# Patient Record
Sex: FEMALE | Race: WHITE | NOT HISPANIC OR LATINO | URBAN - METROPOLITAN AREA
[De-identification: names, ages, dates, MRNs, and addresses within clinical notes are randomized per-mention and may not be internally consistent; named-entity substitution may affect disease eponyms.]

---

## 2024-02-10 ENCOUNTER — EMERGENCY (EMERGENCY)
Facility: HOSPITAL | Age: 24
LOS: 1 days | Discharge: HOME | End: 2024-02-10
Attending: EMERGENCY MEDICINE | Admitting: EMERGENCY MEDICINE
Payer: COMMERCIAL

## 2024-02-10 VITALS
SYSTOLIC BLOOD PRESSURE: 110 MMHG | HEART RATE: 83 BPM | WEIGHT: 149.91 LBS | DIASTOLIC BLOOD PRESSURE: 73 MMHG | TEMPERATURE: 98 F | RESPIRATION RATE: 18 BRPM | OXYGEN SATURATION: 98 %

## 2024-02-10 VITALS
HEART RATE: 70 BPM | RESPIRATION RATE: 18 BRPM | SYSTOLIC BLOOD PRESSURE: 114 MMHG | DIASTOLIC BLOOD PRESSURE: 65 MMHG | OXYGEN SATURATION: 98 %

## 2024-02-10 LAB
ANION GAP SERPL CALC-SCNC: 10 MMOL/L — SIGNIFICANT CHANGE UP (ref 5–17)
APPEARANCE UR: CLEAR — SIGNIFICANT CHANGE UP
BACTERIA # UR AUTO: ABNORMAL /HPF
BASOPHILS # BLD AUTO: 0.04 K/UL — SIGNIFICANT CHANGE UP (ref 0–0.2)
BASOPHILS NFR BLD AUTO: 0.4 % — SIGNIFICANT CHANGE UP (ref 0–2)
BILIRUB UR-MCNC: NEGATIVE — SIGNIFICANT CHANGE UP
BUN SERPL-MCNC: 15 MG/DL — SIGNIFICANT CHANGE UP (ref 7–23)
CALCIUM SERPL-MCNC: 9.4 MG/DL — SIGNIFICANT CHANGE UP (ref 8.4–10.5)
CAST: 4 /LPF — SIGNIFICANT CHANGE UP (ref 0–4)
CHLORIDE SERPL-SCNC: 100 MMOL/L — SIGNIFICANT CHANGE UP (ref 96–108)
CO2 SERPL-SCNC: 26 MMOL/L — SIGNIFICANT CHANGE UP (ref 22–31)
COLOR SPEC: YELLOW — SIGNIFICANT CHANGE UP
CREAT SERPL-MCNC: 0.66 MG/DL — SIGNIFICANT CHANGE UP (ref 0.5–1.3)
DIFF PNL FLD: NEGATIVE — SIGNIFICANT CHANGE UP
EGFR: 125 ML/MIN/1.73M2 — SIGNIFICANT CHANGE UP
EOSINOPHIL # BLD AUTO: 0.06 K/UL — SIGNIFICANT CHANGE UP (ref 0–0.5)
EOSINOPHIL NFR BLD AUTO: 0.6 % — SIGNIFICANT CHANGE UP (ref 0–6)
GLUCOSE SERPL-MCNC: 82 MG/DL — SIGNIFICANT CHANGE UP (ref 70–99)
GLUCOSE UR QL: NEGATIVE MG/DL — SIGNIFICANT CHANGE UP
HCT VFR BLD CALC: 38.6 % — SIGNIFICANT CHANGE UP (ref 34.5–45)
HGB BLD-MCNC: 13.1 G/DL — SIGNIFICANT CHANGE UP (ref 11.5–15.5)
IMM GRANULOCYTES NFR BLD AUTO: 0.4 % — SIGNIFICANT CHANGE UP (ref 0–0.9)
KETONES UR-MCNC: ABNORMAL MG/DL
LEUKOCYTE ESTERASE UR-ACNC: ABNORMAL
LYMPHOCYTES # BLD AUTO: 1.56 K/UL — SIGNIFICANT CHANGE UP (ref 1–3.3)
LYMPHOCYTES # BLD AUTO: 15 % — SIGNIFICANT CHANGE UP (ref 13–44)
MCHC RBC-ENTMCNC: 30.6 PG — SIGNIFICANT CHANGE UP (ref 27–34)
MCHC RBC-ENTMCNC: 33.9 GM/DL — SIGNIFICANT CHANGE UP (ref 32–36)
MCV RBC AUTO: 90.2 FL — SIGNIFICANT CHANGE UP (ref 80–100)
MONOCYTES # BLD AUTO: 0.61 K/UL — SIGNIFICANT CHANGE UP (ref 0–0.9)
MONOCYTES NFR BLD AUTO: 5.9 % — SIGNIFICANT CHANGE UP (ref 2–14)
NEUTROPHILS # BLD AUTO: 8.08 K/UL — HIGH (ref 1.8–7.4)
NEUTROPHILS NFR BLD AUTO: 77.7 % — HIGH (ref 43–77)
NITRITE UR-MCNC: NEGATIVE — SIGNIFICANT CHANGE UP
NRBC # BLD: 0 /100 WBCS — SIGNIFICANT CHANGE UP (ref 0–0)
PH UR: 5.5 — SIGNIFICANT CHANGE UP (ref 5–8)
PLATELET # BLD AUTO: 233 K/UL — SIGNIFICANT CHANGE UP (ref 150–400)
POTASSIUM SERPL-MCNC: 4.3 MMOL/L — SIGNIFICANT CHANGE UP (ref 3.5–5.3)
POTASSIUM SERPL-SCNC: 4.3 MMOL/L — SIGNIFICANT CHANGE UP (ref 3.5–5.3)
PROT UR-MCNC: NEGATIVE MG/DL — SIGNIFICANT CHANGE UP
RBC # BLD: 4.28 M/UL — SIGNIFICANT CHANGE UP (ref 3.8–5.2)
RBC # FLD: 12.4 % — SIGNIFICANT CHANGE UP (ref 10.3–14.5)
RBC CASTS # UR COMP ASSIST: 1 /HPF — SIGNIFICANT CHANGE UP (ref 0–4)
SODIUM SERPL-SCNC: 136 MMOL/L — SIGNIFICANT CHANGE UP (ref 135–145)
SP GR SPEC: 1.02 — SIGNIFICANT CHANGE UP (ref 1–1.03)
SQUAMOUS # UR AUTO: 11 /HPF — HIGH (ref 0–5)
UROBILINOGEN FLD QL: 0.2 MG/DL — SIGNIFICANT CHANGE UP (ref 0.2–1)
WBC # BLD: 10.39 K/UL — SIGNIFICANT CHANGE UP (ref 3.8–10.5)
WBC # FLD AUTO: 10.39 K/UL — SIGNIFICANT CHANGE UP (ref 3.8–10.5)
WBC UR QL: 9 /HPF — HIGH (ref 0–5)

## 2024-02-10 PROCEDURE — 99285 EMERGENCY DEPT VISIT HI MDM: CPT

## 2024-02-10 PROCEDURE — 70450 CT HEAD/BRAIN W/O DYE: CPT | Mod: 26,MA

## 2024-02-10 PROCEDURE — 90715 TDAP VACCINE 7 YRS/> IM: CPT

## 2024-02-10 PROCEDURE — 70450 CT HEAD/BRAIN W/O DYE: CPT | Mod: MA

## 2024-02-10 PROCEDURE — 36415 COLL VENOUS BLD VENIPUNCTURE: CPT

## 2024-02-10 PROCEDURE — 13132 CMPLX RPR F/C/C/M/N/AX/G/H/F: CPT

## 2024-02-10 PROCEDURE — 13152 CMPLX RPR E/N/E/L 2.6-7.5 CM: CPT

## 2024-02-10 PROCEDURE — 90471 IMMUNIZATION ADMIN: CPT

## 2024-02-10 PROCEDURE — 80048 BASIC METABOLIC PNL TOTAL CA: CPT

## 2024-02-10 PROCEDURE — 85025 COMPLETE CBC W/AUTO DIFF WBC: CPT

## 2024-02-10 PROCEDURE — 93005 ELECTROCARDIOGRAM TRACING: CPT | Mod: XU

## 2024-02-10 PROCEDURE — 81001 URINALYSIS AUTO W/SCOPE: CPT

## 2024-02-10 PROCEDURE — 99285 EMERGENCY DEPT VISIT HI MDM: CPT | Mod: 25

## 2024-02-10 RX ORDER — SODIUM CHLORIDE 9 MG/ML
1000 INJECTION INTRAMUSCULAR; INTRAVENOUS; SUBCUTANEOUS ONCE
Refills: 0 | Status: COMPLETED | OUTPATIENT
Start: 2024-02-10 | End: 2024-02-10

## 2024-02-10 RX ORDER — TETANUS TOXOID, REDUCED DIPHTHERIA TOXOID AND ACELLULAR PERTUSSIS VACCINE, ADSORBED 5; 2.5; 8; 8; 2.5 [IU]/.5ML; [IU]/.5ML; UG/.5ML; UG/.5ML; UG/.5ML
0.5 SUSPENSION INTRAMUSCULAR ONCE
Refills: 0 | Status: COMPLETED | OUTPATIENT
Start: 2024-02-10 | End: 2024-02-10

## 2024-02-10 RX ADMIN — TETANUS TOXOID, REDUCED DIPHTHERIA TOXOID AND ACELLULAR PERTUSSIS VACCINE, ADSORBED 0.5 MILLILITER(S): 5; 2.5; 8; 8; 2.5 SUSPENSION INTRAMUSCULAR at 11:06

## 2024-02-10 RX ADMIN — SODIUM CHLORIDE 1000 MILLILITER(S): 9 INJECTION INTRAMUSCULAR; INTRAVENOUS; SUBCUTANEOUS at 10:24

## 2024-02-10 NOTE — ED PROVIDER NOTE - CARE PROVIDER_API CALL
Marino Aragon  Plastic Surgery  [address] [place] [id number]  Phone: [phone]  Fax: [phone]  Follow Up Time:

## 2024-02-10 NOTE — ED PROVIDER NOTE - PATIENT PORTAL LINK FT
You can access the FollowMyHealth Patient Portal offered by [facility] by registering at the following website: [email or web address]. By joining [employer]’s FollowMyHealth portal, you will also be able to view your health information using other applications (apps) compatible with our system.

## 2024-02-10 NOTE — ED PROVIDER NOTE - NSFOLLOWUPINSTRUCTIONS_ED_ALL_ED_FT
Drink plenty of fluids.  Follow wound care instructions.  Follow-up with your primary care doctor and Dr. Aragon for reevaluation.  Return to ER for any new or worsening symptoms.    Syncope, Adult  Outline of the head showing blood vessels that supply the brain.  Syncope is when you pass out or faint for a short time. It is caused by a sudden decrease in blood flow to the brain. This can happen for many reasons. It can sometimes happen when seeing blood, getting a shot (injection), or having pain or strong emotions. Most causes of fainting are not dangerous, but in some cases it can be a sign of a serious medical problem.    If you faint, get help right away. Call your local emergency services (911 in the [place]    Follow these instructions at home:  Watch for any changes in your symptoms. Take these actions to stay safe and help with your symptoms:    Knowing when you may be about to faint    Signs that you may be about to faint include:  Feeling dizzy or light-headed. It may feel like the room is spinning.  Feeling weak.  Feeling like you may vomit (nauseous).  Seeing spots or seeing all white or all black.  Having cold, clammy skin.  Feeling warm and sweaty.  Hearing ringing in the ears.  If you start to feel like you might faint, sit or lie down right away. If sitting, lower your head down between your legs. If lying down, raise (elevate) your feet above the level of your heart.  Breathe deeply and steadily. Wait until all of the symptoms are gone.  Have someone stay with you until you feel better.  Medicines    Take over-the-counter and prescription medicines only as told by your doctor.  If you are taking blood pressure or heart medicine, sit up and stand up slowly. Spend a few minutes getting ready to sit and then stand. This can help you feel less dizzy.  Lifestyle    Do not drive, use machinery, or play sports until your doctor says it is okay.  Do not drink alcohol.  Do not smoke or use any products that contain nicotine or tobacco. If you need help quitting, ask your doctor.  Avoid hot tubs and saunas.  General instructions    Talk with your doctor about your symptoms. You may need to have testing to help find the cause.  Drink enough fluid to keep your pee (urine) pale yellow.  Avoid standing for a long time. If you must stand for a long time, do movements such as:  Moving your legs.  Crossing your legs.  Flexing and stretching your leg muscles.  Squatting.  Keep all follow-up visits.  Contact a doctor if:  You have episodes of near fainting.  Get help right away if:  You pass out or faint.  You hit your head or are injured after fainting.  You have any of these symptoms:  Fast or uneven heartbeats (palpitations).  Pain in your chest, belly, or back.  Shortness of breath.  You have jerky movements that you cannot control (seizure).  You have a very bad headache.  You are confused.  You have problems with how you see (vision).  You are very weak.  You have trouble walking.  You are bleeding from your mouth or your butt (rectum).  You have black or tarry poop (stool).  These symptoms may be an emergency. Get help right away. Call your local emergency services (911 in the [place]  Do not wait to see if the symptoms will go away.  Do not drive yourself to the hospital.  Summary  Syncope is when you pass out or faint for a short time. It is caused by a sudden decrease in blood flow to the brain.  Signs that you may be about to faint include feeling dizzy or light-headed, feeling like you may vomit, seeing all white or all black, or having cold, clammy skin.  If you start to feel like you might faint, sit or lie down right away. Lower your head if sitting, or raise (elevate) your feet if lying down. Breathe deeply and steadily. Wait until all of the symptoms are gone.  This information is not intended to replace advice given to you by your health care provider. Make sure you discuss any questions you have with your health care provider.    Document Revised: 04/28/2022 Document Reviewed: 04/28/2022  [employer] Patient Education © 2023 [employer]    Facial Laceration  A facial laceration is a cut on the face. You may need to see a doctor for treatment.    Treatment may help the wound heal and prevent scars.    What are the causes?  A car crash.  An injury when playing sports.  An attack by a person or animal.  A fall.  What are the signs or symptoms?  A cut on the face.  Bleeding.  Pain.  Swelling.  Bruises.  How is this treated?  Your wound will be cleaned. This will help prevent infection.  Your wound may be closed. Your doctor will use stitches, skin glue, or skin tape strips to do this.  You may also be given medicines, such as:  Medicines for pain.  Medicines to prevent or treat infection (antibiotics). This might be pills or an ointment.  A tetanus shot.  Follow these instructions at home:  Follow your doctor's instructions. Ask your doctor if you have problems or questions. Caring for your wound depends on how it was closed.    If you have a bandage:    Wash your hands with soap and water for at least 20 seconds before and after you change your bandage. If you cannot use soap and water, use hand .  Change your bandage.  If stitches were used:      Keep the wound clean and dry.  If you were given a bandage, change it at least one time a day, or as told by your doctor. Also change the bandage if it gets wet or dirty.  Wash the wound with soap and water two times a day, or as told by your doctor. Rinse off the soap with water. Use a clean towel to pat the wound dry.  After cleaning, put a thin layer of antibiotic ointment on the wound as told by your doctor. This helps prevent infection and keeps the bandage from sticking to the wound.  You may shower after the first 24 hours. Do not soak the wound until the stitches are taken out.  Go back to have your stitches taken out as told by your doctor.  Do not wear makeup around the wound until your doctor says it is okay.  If skin glue was used:      You may only wet your wound in the shower or bath very briefly.  After you shower or take a bath, use a clean towel to gently pat the wound dry.  Do not soak or scrub the wound.  Do not swim.  Do not do anything that makes you sweat a lot until the skin glue has fallen off on its own.  Do not put medicines, creams, ointment, or makeup on your wound while the skin glue is in place. This may loosen the glue before your wound is healed.  Do not put tape over the skin glue if you have a bandage. This may pull off the skin glue.  Do not spend a long time in the sun or use a tanning lamp while the skin glue is on the wound.  Do not pick at the skin glue. The skin glue usually stays in place for 5–10 days. Then, it falls off the skin.  If skin tape strips were used:      Keep the wound clean and dry.  Do not let the skin tape strips get wet.  Take care to keep the wound and skin tape strips dry when you take a bath. If the wound gets wet, pat it dry with a clean towel right away.  Skin tape strips fall off on their own over time. You may trim the strips as the wound heals. Do not take off skin tape strips that are still stuck to the wound.  General instructions    Check your wound area every day for signs of infection. Check for:  More redness, swelling, or pain.  Fluid or blood.  Warmth.  Pus or a bad smell.  Take over-the-counter and prescription medicines only as told by your doctor.  If you were prescribed an antibiotic medicine, use it as told by your doctor. Do not stop using it even if you start to feel better.  After the cut has healed, put sunscreen on the area to prevent scars. It can take a year or two for redness and scars to fade.  Contact a doctor if:  You have a fever.  You have any of these signs of infection in or around your wound:  More redness, swelling, or pain.  Fluid or blood.  Warmth.  Pus or a bad smell.  Get help right away if:  You have a red streak going away from your wound.  Summary  A cut on the face may need to be closed with stitches, skin glue, or skin tape strips.  Follow your doctor's instructions for wound care.  Check your wound area every day for signs of infection, such as more redness, swelling, or pain.  This information is not intended to replace advice given to you by your health care provider. Make sure you discuss any questions you have with your health care provider.    Document Revised: 03/17/2021 Document Reviewed: 03/17/2021  [employer] Patient Education © 2023 [employer]    Sutured Wound Care  Sutures are stitches that can be used to close wounds. Some stitches break down as they heal (absorbable). Other stitches need to be taken out by your doctor (nonabsorbable). Taking good care of your wound can help to prevent pain and infection. It can also help your wound heal more quickly. Follow instructions from your doctor about how to care for your sutured wound.    Supplies needed:  Soap and water.  A clean, dry towel.  Solution to clean your wound, if needed.  A clean gauze or bandage (dressing), if needed.  Antibiotic ointment, if told by your doctor.  How to care for your sutured wound  Two stitched wounds. One is normal. The other is red with pus and infected.  Keep the wound fully dry for the first 24 hours or as long as told by your doctor. After 24–48 hours, you may shower or bathe as told by your doctor. Do not soak the wound or put the wound under water until the stitches have been taken out.  After the first 24 hours, clean the wound once a day, or as often as your doctor tells you to. Take these steps:  Wash and rinse the wound as told by your health care provider.  Pat the wound dry with a clean towel. Do not rub the wound.  After cleaning the wound, put a thin layer of antibiotic ointment on the wound as told by your doctor. This will help:  Prevent infection.  Keep the bandage from sticking to the wound.  Follow instructions from your doctor about how to change your bandage. Make sure you:  Wash your hands with soap and water for at least 20 seconds. If you cannot use soap and water, use hand .  Change your bandage at least once a day, or as often as told by your doctor. If your dressing gets wet or dirty, change it.  Leavestitches in place for at least 2 weeks. If you have skin glue over your stitches, this should also stay in place for at least 2 weeks.  Leave tape strips alone (if you have them) unless you are told to take them off. You may trim the edges of the tape strips if they curl up.  Check your wound every day for signs of infection. Watch for:  Redness, swelling, or pain.  Fluid or blood.  New warmth, a rash, or hardness at the wound site.  Pus or a bad smell.  Have the stitches taken out as told by your doctor.  Follow these instructions at home:  Medicines    Take or apply over-the-counter and prescription medicines only as told by your doctor.  If you were prescribed an antibiotic medicine or ointment, take or apply it as told by your doctor. Do not stop using the antibiotic even if you start to feel better.  General instructions    Cover your wound with clothes or put sunscreen on when you are outside. Use a sunscreen of at least 30 SPF.  Do not scratch or pick at your wound.  Avoid stretching your wound.  Raise the injured area above the level of your heart while you are sitting or lying down, if possible.  Eat a diet that includes protein, vitamin A, and vitamin C. Doing this will help your wound heal.  Drink enough fluid to keep your pee (urine) pale yellow.  Keep all follow-up visits.  Contact a doctor if:  You were given a tetanus shot and you have any of the following at the site where the needle went in:  Swelling.  Very bad pain.  Redness.  Bleeding.  Your wound breaks open.  You see something coming out of your wound, such as wood or glass.  You have any of these signs of infection in or around your wound:  Redness, swelling, or pain.  Fluid or blood.  Warmth.  A new rash.  Your wound feels hard.  You have a fever.  The skin near your wound changes color.  You have pain that does not get better with medicine.  You get numbness around the wound.  Get help right away if:  You have very bad swelling or more pain around your wound.  You have pus or a bad smell coming from your wound.  You have painful lumps near your wound or anywhere on your body.  You have a red streak going away from your wound.  The wound is on your hand or foot, and:  Your fingers or toes look pale or blue.  You cannot move a finger or toe as you used to do.  You have numbness that spreads down your hand, foot, fingers, or toes.  Summary  Sutures are stitches that are used to close wounds.  Taking good care of your wound can help to prevent pain and infection.  Keep the wound fully dry for the first 24 hours or for as long as told by your doctor. After 24–48 hours, you may shower or bathe as told by your doctor.  This information is not intended to replace advice given to you by your health care provider. Make sure you discuss any questions you have with your health care provider.    Document Revised: 04/25/2022 Document Reviewed: 04/25/2022  [employer] Patient Education © 2023 [employer]

## 2024-02-10 NOTE — ED ADULT NURSE NOTE - HIV OFFER
Pre-Operative Diagnosis: Pyelonephritis [N12], bilateral reflux       Post-Operative Diagnosis: Pyelonephritis [N12], bilateral reflux     Procedure Performed:   CYSTOSCOPY, POSITIONAL INSTILLATION OF CONTRAST CYSTOGRAM, BILATERAL URETERAL DEFLUX IMPLANT
Previously Negative (within the last year)

## 2024-02-10 NOTE — ED ADULT TRIAGE NOTE - CHIEF COMPLAINT QUOTE
Patient no PMH to the ED s/p syncopal episodes x 2 PTA. States she was at [employer] (has not worked out in awhile) and became dizzy, walked outside and syncopized. Laceration to left eyebrow, no active bleeding, unknown last TDAP, -AC. AAOX4, NAD.

## 2024-02-10 NOTE — ED ADULT NURSE NOTE - OBJECTIVE STATEMENT
patient presents to ED with syncope w/ LOC after working out, denies cp, sob, n/v/abd pain, fever. Lt. eyelid laceration w/o active bleeding. unknown Tdap.

## 2024-02-10 NOTE — ED PROVIDER NOTE - PHYSICAL EXAMINATION
VITAL SIGNS: I have reviewed nursing notes and confirm.  CONSTITUTIONAL: Well appearing, in no acute distress.   SKIN:  warm and dry, no acute rash.   HEAD:  normocephalic, atraumatic.  EYES: WILIAM. EOM intact; conjunctiva and sclera clear. ~ 1.5cm linear lac to just below left eyebrow, smaller 1cm lac supero/medially to eyebrow, no bony depressions, no associated swellig.   ENT: No nasal discharge; airway clear.   NECK: Supple. No midline tenderness.   CARD: S1, S2, Regular rate and rhythm.   RESP:  Clear to auscultation b/l, no wheezes, rales or rhonchi.  ABD: Normal bowel sounds; soft; non-distended; non-tender; no guarding/ rebound.  EXT: Normal ROM. No peripheral edema. Pulses intact and equal b/l.  NEURO: Alert, oriented x 4, CM Ii-XII grossly intact. Motor/ sensation intact and equal b/l. Neg pronator drift. Speech clear and fluent.

## 2024-02-10 NOTE — ED PROVIDER NOTE - CLINICAL SUMMARY MEDICAL DECISION MAKING FREE TEXT BOX
Impression: 22yo f, h/o syncope when having bloods drawn, who presents s/p syncopal episode. Pt was doing a work out at [employer] when she began to feel very faint, lightheaded, hot, sweaty. Pt walked outside, sat on bench when she momentarily passed out. Pt was helped back up onto bench and syncopized again, hitting face on ground. + lac to left eyebrow. No convulsive activity, tongue biting, incontinence. Denies any ha, acute vision/ speech changes, n/t/w, neck/ back pain, n/v, cp, sob, palp... Is feeling well at present. Afebrile. HDS. Pt is well appaering. Neuro exam non-focal. No c-spine tenderness. EKG non-ischemic. Labs reassuring w/ normal wbc, hg/hct, electrolytes and renal function. Urine preg neg. UA likely contaminated. CT head neg for acute bleed/ fx. Pt seen by Dr. Aragon, and wounds repaired. Suspect likely vasovagal/ orthostatic syncope. ED evaluation and management discussed with the patient in detail.  Pt hydrated with ivf and feeling well. Close PMD follow up encouraged.  Strict ED return instructions discussed in detail and patient given the opportunity to ask any questions about their discharge diagnosis and instructions. Patient verbalized understanding.

## 2024-02-10 NOTE — ED PROVIDER NOTE - OBJECTIVE STATEMENT
Pt is a 24yo f, h/o syncope when having bloods drawn, who presents s/p syncopal episode. Pt was doing a work out at [employer] when she began to feel very faint, lightheaded, hot, sweaty. Pt walked outside, sat on bench when she momentarily passed out. Pt was helped back up onto bench and syncopized again, hitting face on ground. + lac to left eyebrow. No convulsive activity, tongue biting, incontinence. Denies any ha, acute vision/ speech changes, n/t/w, neck/ back pain, n/v, cp, sob, palp... Is feeling well at present. Last td unknown.

## 2024-02-13 DIAGNOSIS — R55 SYNCOPE AND COLLAPSE: ICD-10-CM
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
## 2024-02-13 DIAGNOSIS — Z23 ENCOUNTER FOR IMMUNIZATION: ICD-10-CM

## 2024-02-13 DIAGNOSIS — S01.81XA LACERATION WITHOUT FOREIGN BODY OF OTHER PART OF HEAD, INITIAL ENCOUNTER: ICD-10-CM
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
## 2024-02-13 DIAGNOSIS — S01.112A LACERATION WITHOUT FOREIGN BODY OF LEFT EYELID AND PERIOCULAR AREA, INITIAL ENCOUNTER: ICD-10-CM
